# Patient Record
(demographics unavailable — no encounter records)

---

## 2025-01-02 NOTE — DISCUSSION/SUMMARY
[FreeTextEntry1] : 1.  Chest discomfort: EKG reviewed normal sinus rhythm right bundle branch block and left anterior fascicular block.  Will need to go out ischemic heart disease in this middle-aged female with history of diabetes postmenopausal and new onset chest discomfort and shortness of breath.  Will advise once results of an exercise nuclear stress test is known.  At this time patient a bit reluctant wants to think about it. 2.  Shortness of breath: Echocardiogram 3.  Carotid bruit: Carotid duplex 4.  Diabetes: Patient had recent blood work done in October, advised a copy of results for review 5.  Palpitations: Will place a 5-day Holter monitor and advise once results are known.  Patient reports about 8 ounces of caffeine daily Prescription for screening mammogram and bilateral breast ultrasound given, patient considering Cologuard and refusing screening colonoscopy.  Patient declines flu vaccine at this time  [EKG obtained to assist in diagnosis and management of assessed problem(s)] : EKG obtained to assist in diagnosis and management of assessed problem(s)

## 2025-01-02 NOTE — HISTORY OF PRESENT ILLNESS
[FreeTextEntry1] : 79-year-old female with past medical history of diabetes comes in for evaluation of chest discomfort palpitations and shortness of breath.  Patient reports about 2 weeks of a near daily palpitations described as heart beating hard.  She also reports exertional shortness of breath occasional chest discomfort.  Denies any PND orthopnea.